# Patient Record
Sex: MALE | Race: BLACK OR AFRICAN AMERICAN | NOT HISPANIC OR LATINO
[De-identification: names, ages, dates, MRNs, and addresses within clinical notes are randomized per-mention and may not be internally consistent; named-entity substitution may affect disease eponyms.]

---

## 2020-01-14 ENCOUNTER — TRANSCRIPTION ENCOUNTER (OUTPATIENT)
Age: 50
End: 2020-01-14

## 2021-01-17 ENCOUNTER — TRANSCRIPTION ENCOUNTER (OUTPATIENT)
Age: 51
End: 2021-01-17

## 2021-01-28 ENCOUNTER — APPOINTMENT (OUTPATIENT)
Dept: GASTROENTEROLOGY | Facility: CLINIC | Age: 51
End: 2021-01-28
Payer: COMMERCIAL

## 2021-01-28 VITALS
SYSTOLIC BLOOD PRESSURE: 130 MMHG | WEIGHT: 234 LBS | HEIGHT: 71 IN | DIASTOLIC BLOOD PRESSURE: 100 MMHG | TEMPERATURE: 98 F | BODY MASS INDEX: 32.76 KG/M2

## 2021-01-28 DIAGNOSIS — Z01.818 ENCOUNTER FOR OTHER PREPROCEDURAL EXAMINATION: ICD-10-CM

## 2021-01-28 DIAGNOSIS — L30.9 DERMATITIS, UNSPECIFIED: ICD-10-CM

## 2021-01-28 DIAGNOSIS — Z12.11 ENCOUNTER FOR SCREENING FOR MALIGNANT NEOPLASM OF COLON: ICD-10-CM

## 2021-01-28 DIAGNOSIS — Z83.79 FAMILY HISTORY OF OTHER DISEASES OF THE DIGESTIVE SYSTEM: ICD-10-CM

## 2021-01-28 DIAGNOSIS — Z78.9 OTHER SPECIFIED HEALTH STATUS: ICD-10-CM

## 2021-01-28 PROCEDURE — 99072 ADDL SUPL MATRL&STAF TM PHE: CPT

## 2021-01-28 PROCEDURE — 99242 OFF/OP CONSLTJ NEW/EST SF 20: CPT

## 2021-01-28 RX ORDER — DUPILUMAB 200 MG/1.14ML
200 INJECTION, SOLUTION SUBCUTANEOUS
Refills: 0 | Status: ACTIVE | COMMUNITY

## 2021-01-28 RX ORDER — SODIUM SULFATE, POTASSIUM SULFATE, MAGNESIUM SULFATE 17.5; 3.13; 1.6 G/ML; G/ML; G/ML
17.5-3.13-1.6 SOLUTION, CONCENTRATE ORAL
Qty: 1 | Refills: 0 | Status: ACTIVE | COMMUNITY
Start: 2021-01-28 | End: 1900-01-01

## 2021-01-28 NOTE — HISTORY OF PRESENT ILLNESS
[de-identified] : Jan 28, 2021 \par \par NIGEL ARREDONDO \par \par Mr. GEMA MONTANEZ 50 year is referred for colon cancer screening.  The patient denies any change in bowel movements, blood per rectum, abdominal, pelvic or rectal discomfort.   \par \par There is no family history of colon cancer or other gastrointestinal cancers.\par \par The patient denies any unexplained weight loss, fever chills or night sweats.\par \par This is the first screening colonoscopy for the patient.\par \par There is no significant cardiac or pulmonary history.\par \par No complaints of chest pain, shortness of breath, palpitations, cough.\par \par The patient is feeling quite well.\par \par The patient is on no significant anticoagulant therapy or anti platelet therapy. \par \par No adverse reaction to anesthesia in the past.\par \par

## 2021-01-28 NOTE — CONSULT LETTER
[Dear  ___] : Dear ~DAVIDE, [Consult Letter:] : I had the pleasure of evaluating your patient, [unfilled]. [Please see my note below.] : Please see my note below. [Consult Closing:] : Thank you very much for allowing me to participate in the care of this patient.  If you have any questions, please do not hesitate to contact me. [Sincerely,] : Sincerely, [FreeTextEntry2] : Yarelis Downs NP\par [address] [facility]\[place] [address]\par  [FreeTextEntry3] : Jim Sanz MD\par

## 2021-01-28 NOTE — PHYSICAL EXAM
[General Appearance - Alert] : alert [General Appearance - In No Acute Distress] : in no acute distress [General Appearance - Well Nourished] : well nourished [General Appearance - Well Developed] : well developed [General Appearance - Well-Appearing] : healthy appearing [FreeTextEntry1] : obese [Sclera] : the sclera and conjunctiva were normal [Neck Appearance] : the appearance of the neck was normal [Neck Cervical Mass (___cm)] : no neck mass was observed [Jugular Venous Distention Increased] : there was no jugular-venous distention [Auscultation Breath Sounds / Voice Sounds] : lungs were clear to auscultation bilaterally [Apical Impulse] : the apical impulse was normal [Full Pulse] : the pedal pulses are present [Edema] : there was no peripheral edema [Bowel Sounds] : normal bowel sounds [Abdomen Soft] : soft [Abdomen Tenderness] : non-tender [Abdomen Mass (___ Cm)] : no abdominal mass palpated [Cervical Lymph Nodes Enlarged Posterior Bilaterally] : posterior cervical [Cervical Lymph Nodes Enlarged Anterior Bilaterally] : anterior cervical [Supraclavicular Lymph Nodes Enlarged Bilaterally] : supraclavicular [Axillary Lymph Nodes Enlarged Bilaterally] : axillary [Femoral Lymph Nodes Enlarged Bilaterally] : femoral [Inguinal Lymph Nodes Enlarged Bilaterally] : inguinal [No CVA Tenderness] : no ~M costovertebral angle tenderness [No Spinal Tenderness] : no spinal tenderness [Abnormal Walk] : normal gait [Nail Clubbing] : no clubbing  or cyanosis of the fingernails [Musculoskeletal - Swelling] : no joint swelling seen [Motor Tone] : muscle strength and tone were normal [Skin Turgor] : normal skin turgor [Skin Color & Pigmentation] : normal skin color and pigmentation [] : no rash [No Focal Deficits] : no focal deficits [Oriented To Time, Place, And Person] : oriented to person, place, and time [Impaired Insight] : insight and judgment were intact [Affect] : the affect was normal

## 2021-03-24 ENCOUNTER — APPOINTMENT (OUTPATIENT)
Dept: GASTROENTEROLOGY | Facility: AMBULATORY MEDICAL SERVICES | Age: 51
End: 2021-03-24
Payer: COMMERCIAL

## 2021-03-24 PROCEDURE — 45378 DIAGNOSTIC COLONOSCOPY: CPT

## 2021-04-08 ENCOUNTER — NON-APPOINTMENT (OUTPATIENT)
Age: 51
End: 2021-04-08

## 2021-10-03 ENCOUNTER — HOSPITAL ENCOUNTER (EMERGENCY)
Facility: HOSPITAL | Age: 51
Discharge: HOME | End: 2021-10-03
Attending: EMERGENCY MEDICINE
Payer: COMMERCIAL

## 2021-10-03 ENCOUNTER — APPOINTMENT (EMERGENCY)
Dept: RADIOLOGY | Facility: HOSPITAL | Age: 51
End: 2021-10-03
Payer: COMMERCIAL

## 2021-10-03 VITALS
HEART RATE: 94 BPM | OXYGEN SATURATION: 97 % | SYSTOLIC BLOOD PRESSURE: 155 MMHG | RESPIRATION RATE: 20 BRPM | TEMPERATURE: 97.5 F | DIASTOLIC BLOOD PRESSURE: 108 MMHG

## 2021-10-03 DIAGNOSIS — S86.812A PATELLAR TENDON RUPTURE, LEFT, INITIAL ENCOUNTER: Primary | ICD-10-CM

## 2021-10-03 PROCEDURE — 99283 EMERGENCY DEPT VISIT LOW MDM: CPT

## 2021-10-03 PROCEDURE — 99284 EMERGENCY DEPT VISIT MOD MDM: CPT | Performed by: EMERGENCY MEDICINE

## 2021-10-03 PROCEDURE — 73564 X-RAY EXAM KNEE 4 OR MORE: CPT

## 2021-10-03 RX ORDER — METHOCARBAMOL 500 MG/1
500 TABLET, FILM COATED ORAL EVERY 8 HOURS PRN
Qty: 20 TABLET | Refills: 0 | Status: SHIPPED | OUTPATIENT
Start: 2021-10-03

## 2021-10-03 RX ORDER — OXYCODONE HYDROCHLORIDE AND ACETAMINOPHEN 5; 325 MG/1; MG/1
1 TABLET ORAL ONCE
Status: DISCONTINUED | OUTPATIENT
Start: 2021-10-03 | End: 2021-10-03

## 2021-10-03 RX ORDER — KETOROLAC TROMETHAMINE 30 MG/ML
30 INJECTION, SOLUTION INTRAMUSCULAR; INTRAVENOUS ONCE
Status: DISCONTINUED | OUTPATIENT
Start: 2021-10-03 | End: 2021-10-03 | Stop reason: HOSPADM

## 2021-10-06 ENCOUNTER — NON-APPOINTMENT (OUTPATIENT)
Age: 51
End: 2021-10-06

## 2021-10-07 ENCOUNTER — APPOINTMENT (OUTPATIENT)
Dept: ORTHOPEDIC SURGERY | Facility: CLINIC | Age: 51
End: 2021-10-07
Payer: COMMERCIAL

## 2021-10-07 ENCOUNTER — NON-APPOINTMENT (OUTPATIENT)
Age: 51
End: 2021-10-07

## 2021-10-07 DIAGNOSIS — S86.812A STRAIN OF OTHER MUSCLE(S) AND TENDON(S) AT LOWER LEG LEVEL, LEFT LEG, INITIAL ENCOUNTER: ICD-10-CM

## 2021-10-07 PROCEDURE — 99203 OFFICE O/P NEW LOW 30 MIN: CPT

## 2021-10-07 NOTE — HISTORY OF PRESENT ILLNESS
[de-identified] : Pt is a 49 y/o male with left knee injury.  He fell down three steps outside on 10/3/21.  He was in [place] and was seen at [facility]'s ED.  Xrays revealed a dislocated patella.  He states that a reduction was not attempted because they believed that he ruptured his tendon.  A knee immobilizer was applied and he was advised to follow up with a specialist.  He states that he had surgical repair of a ruptured right patella tendon years ago.

## 2021-10-07 NOTE — DISCUSSION/SUMMARY
[de-identified] : The underlying pathophysiology was reviewed with the patient. XR films were reviewed with the patient. Discussed at length the nature of the patient’s condition. The left knee symptoms appear secondary to patella tendon rupture.\par \par Treatment options were discussed including; surgical intervention. The patient wishes to proceed with left patella tendon repair at this time. The risks and benefits were reviewed with the patient. All of his questions were answered. He will meet with our surgical scheduler. \par

## 2021-10-07 NOTE — END OF VISIT
[FreeTextEntry3] : All medical record entries made by the Scribe were at my,  Dr. Washington Marques MD., direction and personally dictated by me on 10/07/2021. I have personally reviewed the chart and agree that the record accurately reflects my personal performance of the history, physical exam, assessment and plan.

## 2021-10-07 NOTE — ADDENDUM
[FreeTextEntry1] : I, Dalila Pak wrote this note acting as a scribe for Dr. Washington Marques on Oct 07, 2021.

## 2021-10-07 NOTE — PHYSICAL EXAM
[de-identified] : Patient is WDWN, alert, and in no acute distress. Breathing is unlabored. He is grossly oriented to person, place, and time.\par \par Patient presents in a knee immobilizer brace and is ambulating with crutches.\par \par Left Knee:\par High riding patella.\par Edema noted\par Ecchymosis noted.\par No active extension of the knee. \par  [de-identified] : 4 views of the left knee revealed the patella to be high riding due to tear of the patellar tendon.

## 2021-10-11 ENCOUNTER — OUTPATIENT (OUTPATIENT)
Dept: OUTPATIENT SERVICES | Facility: HOSPITAL | Age: 51
LOS: 1 days | End: 2021-10-11
Payer: COMMERCIAL

## 2021-10-11 VITALS
HEIGHT: 71 IN | WEIGHT: 220.02 LBS | HEART RATE: 68 BPM | DIASTOLIC BLOOD PRESSURE: 87 MMHG | TEMPERATURE: 96 F | OXYGEN SATURATION: 98 % | SYSTOLIC BLOOD PRESSURE: 123 MMHG | RESPIRATION RATE: 16 BRPM

## 2021-10-11 DIAGNOSIS — S86.819A STRAIN OF OTHER MUSCLE(S) AND TENDON(S) AT LOWER LEG LEVEL, UNSPECIFIED LEG, INITIAL ENCOUNTER: ICD-10-CM

## 2021-10-11 DIAGNOSIS — S86.812A STRAIN OF OTHER MUSCLE(S) AND TENDON(S) AT LOWER LEG LEVEL, LEFT LEG, INITIAL ENCOUNTER: ICD-10-CM

## 2021-10-11 DIAGNOSIS — Z98.890 OTHER SPECIFIED POSTPROCEDURAL STATES: Chronic | ICD-10-CM

## 2021-10-11 DIAGNOSIS — Z98.89 OTHER SPECIFIED POSTPROCEDURAL STATES: Chronic | ICD-10-CM

## 2021-10-11 DIAGNOSIS — Z01.818 ENCOUNTER FOR OTHER PREPROCEDURAL EXAMINATION: ICD-10-CM

## 2021-10-11 LAB
ANION GAP SERPL CALC-SCNC: 7 MMOL/L — SIGNIFICANT CHANGE UP (ref 5–17)
BUN SERPL-MCNC: 13 MG/DL — SIGNIFICANT CHANGE UP (ref 7–23)
CALCIUM SERPL-MCNC: 9 MG/DL — SIGNIFICANT CHANGE UP (ref 8.4–10.5)
CHLORIDE SERPL-SCNC: 105 MMOL/L — SIGNIFICANT CHANGE UP (ref 96–108)
CO2 SERPL-SCNC: 26 MMOL/L — SIGNIFICANT CHANGE UP (ref 22–31)
CREAT SERPL-MCNC: 1.13 MG/DL — SIGNIFICANT CHANGE UP (ref 0.5–1.3)
GLUCOSE SERPL-MCNC: 145 MG/DL — HIGH (ref 70–99)
HCT VFR BLD CALC: 43 % — SIGNIFICANT CHANGE UP (ref 39–50)
HGB BLD-MCNC: 14.8 G/DL — SIGNIFICANT CHANGE UP (ref 13–17)
MCHC RBC-ENTMCNC: 29.7 PG — SIGNIFICANT CHANGE UP (ref 27–34)
MCHC RBC-ENTMCNC: 34.4 GM/DL — SIGNIFICANT CHANGE UP (ref 32–36)
MCV RBC AUTO: 86.3 FL — SIGNIFICANT CHANGE UP (ref 80–100)
NRBC # BLD: 0 /100 WBCS — SIGNIFICANT CHANGE UP (ref 0–0)
PLATELET # BLD AUTO: 215 K/UL — SIGNIFICANT CHANGE UP (ref 150–400)
POTASSIUM SERPL-MCNC: 3.9 MMOL/L — SIGNIFICANT CHANGE UP (ref 3.5–5.3)
POTASSIUM SERPL-SCNC: 3.9 MMOL/L — SIGNIFICANT CHANGE UP (ref 3.5–5.3)
RBC # BLD: 4.98 M/UL — SIGNIFICANT CHANGE UP (ref 4.2–5.8)
RBC # FLD: 12.1 % — SIGNIFICANT CHANGE UP (ref 10.3–14.5)
SODIUM SERPL-SCNC: 138 MMOL/L — SIGNIFICANT CHANGE UP (ref 135–145)
WBC # BLD: 5.63 K/UL — SIGNIFICANT CHANGE UP (ref 3.8–10.5)
WBC # FLD AUTO: 5.63 K/UL — SIGNIFICANT CHANGE UP (ref 3.8–10.5)

## 2021-10-11 PROCEDURE — 36415 COLL VENOUS BLD VENIPUNCTURE: CPT

## 2021-10-11 PROCEDURE — 93010 ELECTROCARDIOGRAM REPORT: CPT

## 2021-10-11 PROCEDURE — 93005 ELECTROCARDIOGRAM TRACING: CPT

## 2021-10-11 PROCEDURE — U0005: CPT

## 2021-10-11 PROCEDURE — U0003: CPT

## 2021-10-11 PROCEDURE — 80048 BASIC METABOLIC PNL TOTAL CA: CPT

## 2021-10-11 PROCEDURE — G0463: CPT

## 2021-10-11 PROCEDURE — 85027 COMPLETE CBC AUTOMATED: CPT

## 2021-10-11 NOTE — H&P PST ADULT - NS NEC GEN PE MLT EXAM PC
Telephone Encounter by Yanelis Bobby CMA at 08/03/18 10:57 AM     Author:  Yanelis Bobby CMA Service:  (none) Author Type:  Certified Medical Assistant     Filed:  08/03/18 10:57 AM Encounter Date:  8/1/2018 Status:  Signed     :  Yanelis Bobby CMA (Certified Medical Assistant)       From: Uri Dietrich  To: Yuniel Saini MD  Sent: 8/1/2018  2:07 PM CDT  Subject: Medication Renewal Request    Original authorizing provider: MD Uri Wiley would like a refill of the following medications:  Bumetanide (BUMEX) 1 MG tablet [Yuniel Saini MD]    Preferred pharmacy: [facility] [address]    Comment:         Revision History        Date/Time User Provider Type Action    > 08/03/18 10:57 AM Yanelis Bobby CMA Certified Medical Assistant Sign    Attribution information within the note text is not available.            
Telephone Encounter by Yanelis Bobby CMA at 08/03/18 12:33 PM     Author:  Yanelis Bobby CMA Service:  (none) Author Type:  Certified Medical Assistant     Filed:  08/03/18 12:36 PM Encounter Date:  8/1/2018 Status:  Signed     :  Yanelis Bobby CMA (Certified Medical Assistant)            Fwd to[BR1.1T] Dr. Gonzalez[BR1.1M]. Below refill request requires your intervention because:   · No protocol for medication available  · Last refilled on[BR1.1T] 11/2/17[BR1.1M] for #[BR1.1T]360[BR1.1M] with[BR1.1T] 0[BR1.1M] additional refills.[BR1.1T]         Diuretics Refill Protocol Failed8/3 10:58 AM   Last BP under 140/90 in past year or if patient has diabetes, CAD, or PVD, BP under 130/80 in past year[BR1.1C]           Revision History        User Key Date/Time User Provider Type Action    > BR1.1 08/03/18 12:36 PM Yanelis Bobby CMA Certified Medical Assistant Sign    C - Copied, M - Manual, T - Template            
Telephone Encounter by Yanelis Bobby CMA at 08/03/18 12:38 PM     Author:  Yanelis Bobby CMA Service:  (none) Author Type:  Certified Medical Assistant     Filed:  08/03/18 12:39 PM Encounter Date:  8/1/2018 Status:  Signed     :  Yanelis Bobby CMA (Certified Medical Assistant)            Sent in error to Dr. Gonzalez    Fwd to Dr. Saini: please advise on refill. Thank janice carl[BR1.1M]      Revision History        User Key Date/Time User Provider Type Action    > BR1.1 08/03/18 12:39 PM Yanelis Bobby CMA Certified Medical Assistant Sign    M - Manual            
Telephone Encounter by Yuniel Saini MD at 08/07/18 05:24 PM     Author:  Yuniel Saini MD Service:  (none) Author Type:  Physician     Filed:  08/07/18 05:24 PM Encounter Date:  8/1/2018 Status:  Signed     :  Yuniel Saini MD (Physician)            ok x   6 mo[SH1.1M]      Revision History        User Key Date/Time User Provider Type Action    > SH1.1 08/07/18 05:24 PM Yuniel Saini MD Physician Sign    M - Manual            
No bruits; no thyromegaly or nodules

## 2021-10-11 NOTE — H&P PST ADULT - HISTORY OF PRESENT ILLNESS
51 yo male reports  51 yo male reports fall injury 10/3/2021 whicg resulted in left pateela tendon rupture.  He is scheduled for repair of left patella tendon on 10/15/2021 at [facility].

## 2021-10-11 NOTE — H&P PST ADULT - NSICDXPASTMEDICALHX_GEN_ALL_CORE_FT
PAST MEDICAL HISTORY:  Eczema      PAST MEDICAL HISTORY:  Eczema     Patellar tendon rupture left

## 2021-10-11 NOTE — H&P PST ADULT - PROBLEM SELECTOR PLAN 1
repair of left patella tendon is planned for 10/15/2021  Covid PCR testing today  medical clearance requested  Pre op instructions reviewed; best wishes offered.

## 2021-10-11 NOTE — H&P PST ADULT - NSICDXFAMILYHX_GEN_ALL_CORE_FT
FAMILY HISTORY:  Father  Still living? No  Family history of heart failure, Age at diagnosis: Age Unknown    Sibling  Still living? No  Family history of cancer, Age at diagnosis: Age Unknown  Family history of cardiac arrest, Age at diagnosis: Age Unknown

## 2021-10-11 NOTE — H&P PST ADULT - NSICDXPASTSURGICALHX_GEN_ALL_CORE_FT
PAST SURGICAL HISTORY:  S/P appendectomy 7/1985    Status post tendon repair patella tendon right, 2015

## 2021-10-11 NOTE — H&P PST ADULT - MUSCULOSKELETAL
no calf tenderness/decreased ROM due to pain details… detailed exam no calf tenderness/decreased ROM due to pain/joint swelling

## 2021-10-12 LAB — SARS-COV-2 RNA SPEC QL NAA+PROBE: SIGNIFICANT CHANGE UP

## 2021-10-14 ENCOUNTER — TRANSCRIPTION ENCOUNTER (OUTPATIENT)
Age: 51
End: 2021-10-14

## 2021-10-15 ENCOUNTER — APPOINTMENT (OUTPATIENT)
Dept: ORTHOPEDIC SURGERY | Facility: HOSPITAL | Age: 51
End: 2021-10-15

## 2021-10-15 ENCOUNTER — OUTPATIENT (OUTPATIENT)
Dept: OUTPATIENT SERVICES | Facility: HOSPITAL | Age: 51
LOS: 1 days | End: 2021-10-15
Payer: COMMERCIAL

## 2021-10-15 VITALS
WEIGHT: 219.8 LBS | TEMPERATURE: 99 F | DIASTOLIC BLOOD PRESSURE: 95 MMHG | OXYGEN SATURATION: 99 % | HEART RATE: 95 BPM | HEIGHT: 71 IN | RESPIRATION RATE: 16 BRPM | SYSTOLIC BLOOD PRESSURE: 134 MMHG

## 2021-10-15 VITALS
DIASTOLIC BLOOD PRESSURE: 91 MMHG | RESPIRATION RATE: 52 BRPM | HEART RATE: 90 BPM | TEMPERATURE: 97 F | OXYGEN SATURATION: 95 % | SYSTOLIC BLOOD PRESSURE: 137 MMHG

## 2021-10-15 DIAGNOSIS — Z98.890 OTHER SPECIFIED POSTPROCEDURAL STATES: Chronic | ICD-10-CM

## 2021-10-15 DIAGNOSIS — Z98.89 OTHER SPECIFIED POSTPROCEDURAL STATES: Chronic | ICD-10-CM

## 2021-10-15 DIAGNOSIS — Z01.818 ENCOUNTER FOR OTHER PREPROCEDURAL EXAMINATION: ICD-10-CM

## 2021-10-15 DIAGNOSIS — S86.812A STRAIN OF OTHER MUSCLE(S) AND TENDON(S) AT LOWER LEG LEVEL, LEFT LEG, INITIAL ENCOUNTER: ICD-10-CM

## 2021-10-15 PROCEDURE — 97161 PT EVAL LOW COMPLEX 20 MIN: CPT

## 2021-10-15 PROCEDURE — 27380 REPAIR OF KNEECAP TENDON: CPT | Mod: LT

## 2021-10-15 RX ORDER — SODIUM CHLORIDE 9 MG/ML
1000 INJECTION, SOLUTION INTRAVENOUS
Refills: 0 | Status: DISCONTINUED | OUTPATIENT
Start: 2021-10-15 | End: 2021-10-21

## 2021-10-15 RX ORDER — DUPILUMAB 300 MG/2ML
0 INJECTION, SOLUTION SUBCUTANEOUS
Qty: 0 | Refills: 0 | DISCHARGE

## 2021-10-15 RX ORDER — ASPIRIN/CALCIUM CARB/MAGNESIUM 324 MG
1 TABLET ORAL
Qty: 84 | Refills: 0
Start: 2021-10-15 | End: 2021-11-25

## 2021-10-15 RX ORDER — ONDANSETRON 8 MG/1
4 TABLET, FILM COATED ORAL ONCE
Refills: 0 | Status: DISCONTINUED | OUTPATIENT
Start: 2021-10-15 | End: 2021-10-21

## 2021-10-15 RX ORDER — OXYCODONE HYDROCHLORIDE 5 MG/1
5 TABLET ORAL ONCE
Refills: 0 | Status: DISCONTINUED | OUTPATIENT
Start: 2021-10-15 | End: 2021-10-21

## 2021-10-15 RX ORDER — HYDROMORPHONE HYDROCHLORIDE 2 MG/ML
0.5 INJECTION INTRAMUSCULAR; INTRAVENOUS; SUBCUTANEOUS
Refills: 0 | Status: DISCONTINUED | OUTPATIENT
Start: 2021-10-15 | End: 2021-10-21

## 2021-10-15 RX ORDER — IBUPROFEN 200 MG
1 TABLET ORAL
Qty: 30 | Refills: 0
Start: 2021-10-15

## 2021-10-15 RX ORDER — CEFAZOLIN SODIUM 1 G
2000 VIAL (EA) INJECTION ONCE
Refills: 0 | Status: COMPLETED | OUTPATIENT
Start: 2021-10-15 | End: 2021-10-15

## 2021-10-15 RX ORDER — CHLORHEXIDINE GLUCONATE 213 G/1000ML
1 SOLUTION TOPICAL ONCE
Refills: 0 | Status: COMPLETED | OUTPATIENT
Start: 2021-10-15 | End: 2021-10-15

## 2021-10-15 RX ORDER — APREPITANT 80 MG/1
40 CAPSULE ORAL ONCE
Refills: 0 | Status: COMPLETED | OUTPATIENT
Start: 2021-10-15 | End: 2021-10-15

## 2021-10-15 RX ADMIN — HYDROMORPHONE HYDROCHLORIDE 0.5 MILLIGRAM(S): 2 INJECTION INTRAMUSCULAR; INTRAVENOUS; SUBCUTANEOUS at 15:25

## 2021-10-15 RX ADMIN — CHLORHEXIDINE GLUCONATE 1 APPLICATION(S): 213 SOLUTION TOPICAL at 12:35

## 2021-10-15 RX ADMIN — SODIUM CHLORIDE 75 MILLILITER(S): 9 INJECTION, SOLUTION INTRAVENOUS at 15:24

## 2021-10-15 RX ADMIN — HYDROMORPHONE HYDROCHLORIDE 0.5 MILLIGRAM(S): 2 INJECTION INTRAMUSCULAR; INTRAVENOUS; SUBCUTANEOUS at 15:50

## 2021-10-15 RX ADMIN — HYDROMORPHONE HYDROCHLORIDE 0.5 MILLIGRAM(S): 2 INJECTION INTRAMUSCULAR; INTRAVENOUS; SUBCUTANEOUS at 15:35

## 2021-10-15 RX ADMIN — HYDROMORPHONE HYDROCHLORIDE 0.5 MILLIGRAM(S): 2 INJECTION INTRAMUSCULAR; INTRAVENOUS; SUBCUTANEOUS at 15:40

## 2021-10-15 RX ADMIN — APREPITANT 40 MILLIGRAM(S): 80 CAPSULE ORAL at 12:34

## 2021-10-15 NOTE — BRIEF OPERATIVE NOTE - NSICDXBRIEFPOSTOP_GEN_ALL_CORE_FT
POST-OP DIAGNOSIS:  Patellar tendon rupture, left, initial encounter 15-Oct-2021 15:07:54  Mark Hubbard

## 2021-10-15 NOTE — ASU DISCHARGE PLAN (ADULT/PEDIATRIC) - ASU DC SPECIAL INSTRUCTIONSFT
Keep cast clean and dry  elevate left leg to reduce swelling/stiffness  call office for appointment  to be seen  10-14 days post op

## 2021-10-15 NOTE — PHYSICAL THERAPY INITIAL EVALUATION ADULT - ADDITIONAL COMMENTS
Pt is going to stay in mom's house w/ 3 steps to enter with no rail. Pt is staying on the first floor

## 2021-10-15 NOTE — BRIEF OPERATIVE NOTE - NSICDXBRIEFPREOP_GEN_ALL_CORE_FT
PRE-OP DIAGNOSIS:  Patellar tendon rupture, left, initial encounter 15-Oct-2021 15:08:03  Mark Hubbard

## 2021-10-18 PROBLEM — S86.819A STRAIN OF OTHER MUSCLE(S) AND TENDON(S) AT LOWER LEG LEVEL, UNSPECIFIED LEG, INITIAL ENCOUNTER: Chronic | Status: ACTIVE | Noted: 2021-10-11

## 2021-10-21 ENCOUNTER — APPOINTMENT (OUTPATIENT)
Dept: ORTHOPEDIC SURGERY | Facility: CLINIC | Age: 51
End: 2021-10-21
Payer: COMMERCIAL

## 2021-10-21 PROCEDURE — 99024 POSTOP FOLLOW-UP VISIT: CPT

## 2021-10-21 NOTE — END OF VISIT
[FreeTextEntry3] : All medical record entries made by the Scribe were at my,  Dr. Washington Marques MD., direction and personally dictated by me on 10/21/2021. I have personally reviewed the chart and agree that the record accurately reflects my personal performance of the history, physical exam, assessment and plan.

## 2021-10-21 NOTE — HISTORY OF PRESENT ILLNESS
[de-identified] : s/p Repair of left patellar tendon.Application of long leg cylinder cast. [de-identified] : The patient is a 50 year male who returns for the 1st postoperative visit after undergoing Repair of left patellar tendon and application of long leg cylinder cast at [facility]. The surgery was on 10/15/2021. The patient is recovering at home. He reports mild postoperative pain. He presents today on 10/21/21 for windowing of the cast and suture removal. [de-identified] : Patient is WDWN, alert, and in no acute distress. Breathing is unlabored. He is grossly oriented to person, place, and time.\par \par Cast was windowed to visualize the incision site. Sutures were removed. Incision site is healing well, without signs of postoperative infection. Normal amount of postoperative edema and tenderness are present.  [de-identified] : no imaging today  [de-identified] : Cast windowed. Sutures removed. Benzoin and steri strips applied.\par He was advised that he will be in the long leg cylinder cast for a total of 6 weeks from the surgery. \par He was instructed on leg lift exercises of the lower extremities. \par Paperwork filled out for the patients job from the [employer].\par Rx: Adjustable cane.\par Follow up in 4 weeks.

## 2021-10-21 NOTE — ADDENDUM
[FreeTextEntry1] : I, Dalila Pak wrote this note acting as a scribe for Dr. Washington Marques on Oct 21, 2021.

## 2021-11-22 ENCOUNTER — APPOINTMENT (OUTPATIENT)
Dept: ORTHOPEDIC SURGERY | Facility: CLINIC | Age: 51
End: 2021-11-22
Payer: COMMERCIAL

## 2021-11-22 VITALS — HEIGHT: 71 IN | WEIGHT: 220 LBS | BODY MASS INDEX: 30.8 KG/M2

## 2021-11-22 PROCEDURE — 99024 POSTOP FOLLOW-UP VISIT: CPT

## 2021-11-22 NOTE — HISTORY OF PRESENT ILLNESS
[Chills] : no chills [Fever] : no fever [de-identified] : s/p Repair of left patellar tendon.Application of long leg cylinder cast. [de-identified] : The patient is a 50 year male who returns for the 2nd postoperative visit after undergoing Repair of left patellar tendon and application of long leg cylinder cast at [facility]. The surgery was on 10/15/2021. He presents 11/21/2021 for follow up evaluation. He is inquiring about PT. He states he has been using natalie butter.  [de-identified] : Patient is WDWN, alert, and in no acute distress. Breathing is unlabored. He is grossly oriented to person, place, and time.\par \par Patient is WDWN, alert, and in no acute distress. Breathing is unlabored. He is grossly oriented to person, place, and time.\par Cast removed today. \par Left knee: Incision is healing well. There are no signs of infection. Normal amount of postoperative edema and tenderness present.  Knee immobilizer placed.  [de-identified] : AP, lateral, skyline, and tunnel views of the left knee were obtained and revealed S/p #2 fiberwire suture through the medial aspect of the patellar tendon.  [de-identified] : The underlying pathophysiology was reviewed with the patient. XR films were reviewed with the patient. Discussed at length the nature of the patient’s condition.\par \par Patient may follow up in 4 weeks for repeat Xrays in the cast. Cast was removed. A knee immobilizer was placed. We discussed appropriate showering with the knee immobilizer. Weight bearing as tolerated. \par PT script was given for left knee ROM. \par \par Patient can continue activities as tolerated. All questions answered, understanding verbalized. Patient in agreement with plan of care.

## 2021-11-22 NOTE — ADDENDUM
[FreeTextEntry1] : Documented by Rufino Ann acting as a scribe for Dr. Washington Marques on 11/22/2021. All medical record entries made by the Scribe were at my, Dr. Washington Marques, direction and personally dictated by me on 11/22/2021. I have reviewed the chart and agree that the record accurately reflects my personal performance of the history, physical exam, assessment and plan. I have also personally directed, reviewed, and agreed with the chart.

## 2021-12-23 ENCOUNTER — APPOINTMENT (OUTPATIENT)
Dept: ORTHOPEDIC SURGERY | Facility: CLINIC | Age: 51
End: 2021-12-23
Payer: COMMERCIAL

## 2021-12-23 PROCEDURE — 99024 POSTOP FOLLOW-UP VISIT: CPT

## 2021-12-23 NOTE — HISTORY OF PRESENT ILLNESS
[Chills] : no chills [Fever] : no fever [de-identified] : s/p Repair of left patellar tendon and Application of long leg cylinder cast. [de-identified] : The patient is a 50 year male who returns for the 3rd postoperative visit after undergoing Repair of left patellar tendon and application of long leg cylinder cast at [facility]. The surgery was on 10/15/2021. The cast was removed in office on 11/22/21. He presents on 12/23/21 in a knee immobilizer brace. He is in PT at [facility]. He is inquiring about a more structured PT protocol at the request of his therapist. No pain medication at this time.  [de-identified] : Patient is WDWN, alert, and in no acute distress. Breathing is unlabored. He is grossly oriented to person, place, and time.\par \par Patient is WDWN, alert, and in no acute distress. Breathing is unlabored. He is grossly oriented to person, place, and time.\par \par Cast removed on 11/22/21.\par He presents to the office on 12/23/21 in a knee immobilizer brace. \par \par Left knee: Incision is well healed over the patella. There are no signs of infection. Normal amount of postoperative edema and tenderness present.  \par ROM: 0-90°  [de-identified] : no imaging today. [de-identified] : I am advising him to discontinue use of the brace at this time except then going outdoors during rain or snow out of precaution. \par He was instructed to continue with the use of an assistive device as long as his gait is unsteady. \par He may begin use of a stationary bike.\par He was advised that he has no restrictions with regard to physical therapy and should focus on ROM prior to strengthening.\par The patient wishes to proceed with physical therapy of the left knee (WBAT, ROM - 3x/week). A script was given.\par Paperwork was filled out for the patient's employer. He will remain out of work at this time and does not have an expected date of return as of today, 12/23/21.\par Follow up in 4 weeks.

## 2021-12-23 NOTE — END OF VISIT
[FreeTextEntry3] : All medical record entries made by the Scribe were at my,  Dr. Washington Marques MD., direction and personally dictated by me on 12/23/2021. I have personally reviewed the chart and agree that the record accurately reflects my personal performance of the history, physical exam, assessment and plan.

## 2021-12-23 NOTE — ADDENDUM
[FreeTextEntry1] : I, Dalila Pak wrote this note acting as a scribe for Dr. Washington Marques on Dec 23, 2021.

## 2022-01-24 ENCOUNTER — APPOINTMENT (OUTPATIENT)
Dept: ORTHOPEDIC SURGERY | Facility: CLINIC | Age: 52
End: 2022-01-24
Payer: COMMERCIAL

## 2022-01-24 VITALS — HEIGHT: 71 IN | BODY MASS INDEX: 29.4 KG/M2 | WEIGHT: 210 LBS

## 2022-01-24 PROCEDURE — 99213 OFFICE O/P EST LOW 20 MIN: CPT

## 2022-01-24 RX ORDER — LOTEPREDNOL ETABONATE 5 MG/G
0.5 GEL OPHTHALMIC
Qty: 5 | Refills: 0 | Status: ACTIVE | COMMUNITY
Start: 2021-12-27

## 2022-01-24 RX ORDER — DUPILUMAB 300 MG/2ML
300 INJECTION, SOLUTION SUBCUTANEOUS
Qty: 4 | Refills: 0 | Status: ACTIVE | COMMUNITY
Start: 2022-01-19

## 2022-01-24 RX ORDER — LOTEPREDNOL ETABONATE 5 MG/G
0.5 OINTMENT OPHTHALMIC
Qty: 4 | Refills: 0 | Status: ACTIVE | COMMUNITY
Start: 2021-12-28

## 2022-01-24 NOTE — HISTORY OF PRESENT ILLNESS
[de-identified] : The patient is a 50 year male who returns for a follow up visit after undergoing Repair of left patellar tendon and application of long leg cylinder cast at [facility]. The surgery was on 10/15/2021. The cast was removed in office on 11/22/21. He returns on 1/24/22 and reports to be improving. He notes improvements in motion. He is currently in PT and follows an at home exercise program as well. He reports to go back to work at the end of March 2022.

## 2022-01-24 NOTE — DISCUSSION/SUMMARY
[de-identified] : The underlying pathophysiology was reviewed with the patient. Discussed at length the nature of the patient’s condition. \par \par Patient will continue with PT as well as an at home exercise program.\par He was encouraged to utilize a stationary bike as well as continue with exercises such as leg lifts for ROM and strengthening, respectively.\par The patient wishes to proceed with physical therapy of the left knee. (ROM and strengthening).  A script was given.\par \par Paperwork filled out for the patient's employer. He anticipates returning to work at the end of March 2022. \par \par All questions answered, understanding verbalized. Patient in agreement with plan of care. Follow up in 1 month.

## 2022-01-24 NOTE — END OF VISIT
[FreeTextEntry3] : All medical record entries made by the Scribe were at my,  Dr. Washington Marques MD., direction and personally dictated by me on 01/24/2022. I have personally reviewed the chart and agree that the record accurately reflects my personal performance of the history, physical exam, assessment and plan.

## 2022-01-24 NOTE — ADDENDUM
[FreeTextEntry1] : I, Dalila Pak wrote this note acting as a scribe for Dr. Washington Marques on Jan 24, 2022.

## 2022-01-24 NOTE — PHYSICAL EXAM
[de-identified] : Patient is WDWN, alert, and in no acute distress. Breathing is unlabored. He is grossly oriented to person, place, and time.\par \par Cast removed on 11/22/21.\par \par Left knee: \par Incision is well healed over the patella. \par There are no signs of infection. \par No deformities or visible rashes to the lower extremity. \par ROM: 0-100°.  [de-identified] : no imaging today

## 2022-02-14 ENCOUNTER — APPOINTMENT (OUTPATIENT)
Dept: ORTHOPEDIC SURGERY | Facility: CLINIC | Age: 52
End: 2022-02-14
Payer: COMMERCIAL

## 2022-02-14 DIAGNOSIS — S86.812D STRAIN OF OTHER MUSCLE(S) AND TENDON(S) AT LOWER LEG LEVEL, LEFT LEG, SUBSEQUENT ENCOUNTER: ICD-10-CM

## 2022-02-14 PROCEDURE — 99213 OFFICE O/P EST LOW 20 MIN: CPT

## 2022-02-14 NOTE — PHYSICAL EXAM
[de-identified] : Patient is WDWN, alert, and in no acute distress. Breathing is unlabored. He is grossly oriented to person, place, and time.\par \par \par Left knee: \par Incision is well healed over the patella. \par There are no signs of infection. \par No deformities or visible rashes to the lower extremity. \par ROM: 0-100°.  [de-identified] : no imaging today

## 2022-02-14 NOTE — HISTORY OF PRESENT ILLNESS
[de-identified] : The patient is a 50 year male who returns for a follow up visit after undergoing Repair of left patellar tendon and application of long leg cylinder cast at [facility]. The surgery was on 10/15/2021. The cast was removed in office on 11/22/21. Patient overall doing well and planning a return to work

## 2022-02-14 NOTE — REASON FOR VISIT
[Follow-Up Visit] : a follow-up visit for [Leg Fracture] : leg fracture [FreeTextEntry2] : s/p Repair of left patellar tendon

## 2022-05-27 NOTE — REASON FOR VISIT
[Initial Evaluation] : an initial evaluation [FreeTextEntry1] : Colon cancer screening DC instructions

## 2023-04-21 NOTE — ASU PATIENT PROFILE, ADULT - NSTOBACCONEVERSMOKERY/N_GEN_A
Source of information: GREGORY ELIZABETH, Chart review  Patient language: Barbadian  : # [id number] Jose Juan    HPI:      Patient is a 66y old  Male with PMH HLD HTN asthma, found to have acute L1 compression fracture. Pain consulted for back pain. Pt seen and examined at bedside. Reports lumbar back pain score 7-8/10 and not tolerable SCALE USED: (1-10 VNRS). Pt describes pain as aching, non- radiating, alleviated by pain medication, exacerbated by movement and palpation. Has not yet started PO diet. Reports nausea, SOB on exertion. Denies lethargy, chest pain, vomiting, constipation.  Also reports b/l leg cramps. Denies numbness/ tingling. Patient stated goal for pain control: to be able to take deep breaths, get out of bed to chair and ambulate with tolerable pain control. Pt reports taking ibuprofen for pain at home.     PAST MEDICAL & SURGICAL HISTORY:  HTN (hypertension)      Varicose veins      HLD (hyperlipidemia)      Asthma      No significant past surgical history          FAMILY HISTORY:      Social History:  Lives at home with domestic partner (21 Apr 2023 10:32)   [X ] Denies ETOH use, illicit drug use and smoking    Allergies    No Known Allergies    MEDICATIONS  (STANDING):  acetaminophen     Tablet .. 1000 milliGRAM(s) Oral every 6 hours  amLODIPine   Tablet 5 milliGRAM(s) Oral daily  enoxaparin Injectable 40 milliGRAM(s) SubCutaneous every 24 hours  fluticasone propionate 50 MICROgram(s)/spray Nasal Spray 1 Spray(s) Both Nostrils two times a day  gabapentin 100 milliGRAM(s) Oral three times a day  ketorolac   Injectable 15 milliGRAM(s) IV Push every 6 hours  lidocaine   4% Patch 1 Patch Transdermal every 24 hours  losartan 100 milliGRAM(s) Oral daily  losartan 100 milliGRAM(s) Oral once  montelukast 10 milliGRAM(s) Oral at bedtime  pantoprazole    Tablet 40 milliGRAM(s) Oral before breakfast  polyethylene glycol 3350 17 Gram(s) Oral daily  senna 2 Tablet(s) Oral at bedtime    MEDICATIONS  (PRN):  albuterol    90 MICROgram(s) HFA Inhaler 2 Puff(s) Inhalation every 6 hours PRN Shortness of Breath and/or Wheezing  ondansetron Injectable 4 milliGRAM(s) IV Push every 8 hours PRN Nausea and/or Vomiting  oxyCODONE    IR 5 milliGRAM(s) Oral every 4 hours PRN Severe Pain (7 - 10)      Vital Signs Last 24 Hrs  T(C): 36.9 (21 Apr 2023 14:17), Max: 36.9 (21 Apr 2023 14:17)  T(F): 98.4 (21 Apr 2023 14:17), Max: 98.4 (21 Apr 2023 14:17)  HR: 75 (21 Apr 2023 14:17) (70 - 85)  BP: 231/86 (21 Apr 2023 14:17) (140/73 - 233/89)  BP(mean): --  RR: 19 (21 Apr 2023 14:17) (18 - 19)  SpO2: 92% (21 Apr 2023 14:17) (92% - 97%)    Parameters below as of 21 Apr 2023 14:17  Patient On (Oxygen Delivery Method): room air        LABS: Reviewed.                          10.8   7.24  )-----------( 135      ( 21 Apr 2023 05:18 )             33.4     04-21    141  |  110<H>  |  7   ----------------------------<  165<H>  3.3<L>   |  23  |  0.85    Ca    8.8      21 Apr 2023 05:18    TPro  6.9  /  Alb  2.8<L>  /  TBili  0.6  /  DBili  x   /  AST  29  /  ALT  14  /  AlkPhos  72  04-21      LIVER FUNCTIONS - ( 21 Apr 2023 05:18 )  Alb: 2.8 g/dL / Pro: 6.9 g/dL / ALK PHOS: 72 U/L / ALT: 14 U/L DA / AST: 29 U/L / GGT: x           Radiology: Reviewed.   < from: CT Lumbar Spine No Cont (04.21.23 @ 05:24) >  ACC: [id number] EXAM:  CT LUMBAR SPINE   ORDERED BY: SHARONA SHAY     ACC: [id number] EXAM:  CT THORACIC SPINE   ORDERED BY: SHARONA SHAY     PROCEDURE DATE:  04/21/2023          INTERPRETATION:  CT THORACIC SPINE, CT LUMBAR SPINE:    CLINICAL INDICATION: Fall    TECHNIQUE: CT of the thoracic and lumbar spine was performed without the   administration of intravenous contrast, according to standard protocol.    COMPARISON: CT abdomen 7/16/2018    FINDINGS:    ALIGNMENT: Thoracic kyphosis is preserved. Lumbar lordosis is preserved.    VERTEBRAE: The thoracic vertebral bodies are normal in height. Within the   lumbar spine, there is anterior compression deformity at L1 producing   approximately 25% loss of vertebral body height.    DISCS: The disc spaces are maintained.    EVALUATION OF INDIVIDUAL LEVELS DEMONSTRATES: No spinal canal or   neuroforaminal stenosis throughout the thoracic spine.    Within the lumbar spine:    T12-L1: Unremarkable    L1-L2: Unremarkable.    L2-L3: Mild disc bulge producing mild central canal stenosis without   significant neuroforaminal narrowing.    L3-L4: Broad disc bulge producing moderate to severe central canal   stenosis and associated mild to moderate bilateral neuroforaminal   narrowing.    L4-L5: Broad disc bulge producing moderate to severe central canal   stenosis and associated mild to moderate bilateral neuroforaminal   narrowing.    L5-S1: Mild disc bulge producing mild central canal stenosis without   significant neuroforaminal narrowing.    PARAVERTEBRAL SOFT TISSUES: The visualized paravertebral soft tissues   appear within normal limits.    Scarring at the left lung apex.    IMPRESSION:    Acute compression fracture at L1 with approximately 25% loss of vertebral   body height. No retropulsion into the ventral thecal sac. No traumatic   malalignment.    Chronic degenerative disc disease within the lumbar spine, see above for   details.    --- End of Report ---        < end of copied text >      ORT Score -   Family Hx of substance abuse	Female	      Male  Alcohol 	                                           1                     3  Illegal drugs	                                   2                     3  Rx drugs                                           4 	                  4  Personal Hx of substance abuse		  Alcohol 	                                          3	                  3  Illegal drugs                                     4	                  4  Rx drugs                                            5 	                  5  Age between 16- 45 years	           1                     1  hx preadolescent sexual abuse	   3 	                  0  Psychological disease		  ADD, OCD, bipolar, schizophrenia   2	          2  Depression                                           1 	          1  Total: 0    a score of 3 or lower indicates low risk for opioid abuse		  a score of 4-7 indicates moderate risk for opioid abuse		  a score of 8 or higher indicates high risk for opioid abuse    REVIEW OF SYSTEMS:  CONSTITUTIONAL: No fever + fatigue  HEENT:  No difficulty hearing, no change in vision  NECK: No pain or stiffness  RESPIRATORY: No cough, wheezing, chills or hemoptysis; + shortness of breath on exertion   CARDIOVASCULAR: No chest pain, palpitations, dizziness, or leg swelling   GASTROINTESTINAL: No loss of appetite, decreased PO intake. No abdominal or epigastric pain. + nausea, No vomiting; No diarrhea or constipation.   GENITOURINARY: No dysuria, frequency, hematuria, retention or incontinence  MUSCULOSKELETAL: + lumbar back pain no swelling; + b/l leg cramps, no upper or lower motor strength weakness, no saddle anesthesia, bowel/bladder incontinence, no falls   NEURO: No headaches, No numbness/tingling b/l LE, No weakness    PHYSICAL EXAM:  GENERAL:  + fatiguegd & Oriented X4, cooperative, NAD, Good concentration. Speech is muffled.   RESPIRATORY: Respirations even and unlabored. Diminshed to auscultation bilaterally; No rales, rhonchi, wheezing, or rubs  CARDIOVASCULAR: Normal S1/S2, regular rate and rhythm; No murmurs, rubs, or gallops. No JVD. + cardiac monitor in place  GASTROINTESTINAL:  Soft, Nontender, Nondistended; Bowel sounds present  PERIPHERAL VASCULAR:  Extremities warm without edema. 2+ Peripheral Pulses, No cyanosis, No calf tenderness  MUSCULOSKELETAL: Motor Strength 5/5 B/L upper and lower extremities; moves all extremities equally against gravity; + decreased ROM left LE; + left SLR at 45 degrees; + lumbar back tenderness on palpation   SKIN: Warm, dry, intact. No rashes, lesions, scars or wounds.     Risk factors associated with adverse outcomes related to opioid treatment  [ ]  Concurrent benzodiazepine use  [ ]  History/ Active substance use or alcohol use disorder  [ ] Psychiatric co-morbidity  [ ] Sleep apnea  [ ] COPD  [ ] BMI> 35  [ ] Liver dysfunction  [ ] Renal dysfunction  [ ] CHF  [X ] Smoker  [X ]  Age > 60 years    [ X]  [place]  Reviewed and Copied to Chart. See below.    Plan of care and goal oriented pain management treatment options were discussed with patient and /or primary care giver; all questions and concerns were addressed and care was aligned with patient's wishes.    Educated patient on goal oriented pain management treatment options      No

## 2024-01-30 ENCOUNTER — EMERGENCY (EMERGENCY)
Facility: HOSPITAL | Age: 54
LOS: 1 days | Discharge: HOME | End: 2024-01-30
Attending: EMERGENCY MEDICINE
Payer: COMMERCIAL

## 2024-01-30 VITALS
WEIGHT: 117.95 LBS | HEIGHT: 71 IN | RESPIRATION RATE: 16 BRPM | DIASTOLIC BLOOD PRESSURE: 90 MMHG | OXYGEN SATURATION: 98 % | SYSTOLIC BLOOD PRESSURE: 154 MMHG | TEMPERATURE: 98 F | HEART RATE: 87 BPM

## 2024-01-30 DIAGNOSIS — Z98.890 OTHER SPECIFIED POSTPROCEDURAL STATES: Chronic | ICD-10-CM

## 2024-01-30 DIAGNOSIS — Z98.89 OTHER SPECIFIED POSTPROCEDURAL STATES: Chronic | ICD-10-CM

## 2024-01-30 LAB — HIV 1 & 2 AB SERPL IA.RAPID: SIGNIFICANT CHANGE UP

## 2024-01-30 PROCEDURE — 99283 EMERGENCY DEPT VISIT LOW MDM: CPT

## 2024-01-30 PROCEDURE — 86703 HIV-1/HIV-2 1 RESULT ANTBDY: CPT

## 2024-01-30 PROCEDURE — 36415 COLL VENOUS BLD VENIPUNCTURE: CPT

## 2024-01-30 PROCEDURE — 80074 ACUTE HEPATITIS PANEL: CPT

## 2024-01-30 NOTE — ED PROVIDER NOTE - PATIENT PORTAL LINK FT
You can access the FollowMyHealth Patient Portal offered by [facility] by registering at the following website: [email or web address]. By joining [employer]’s FollowMyHealth portal, you will also be able to view your health information using other applications (apps) compatible with our system.

## 2024-01-30 NOTE — ED PROVIDER NOTE - CLINICAL SUMMARY MEDICAL DECISION MAKING FREE TEXT BOX
53-year-old male, presents from work for evaluation of unknown liquid that got in his mouth after lifting garbage bag.  No open wounds inside the mouth or on the face.  No eye irritation and patient denies any exposure to the eyes.  Had a lengthy conversation with patient about the very low risk of transmission of diseases such as HIV or hepatitis through droplet spill from the liquid.  In addition explained that today's blood work does not show an acute infection and which show only a previous infection.  Patient is insistent to be tested today.  Will do blood work and discharged with outpatient occupational health follow-up.

## 2024-01-30 NOTE — ED PROVIDER NOTE - OBJECTIVE STATEMENT
53-year-old male, history of hypertension, presents for evaluation status post exposure to an unknown fluid while at work earlier today.  Was lifting a garbage bag and a small amount of unknown liquid spilled and he got some in his mouth.  Spit it right away and rinse his mouth multiple times.  Denies any spill into the eye.  Denies any recent cuts or wounds inside the mouth or on the tongue.

## 2024-01-30 NOTE — ED ADULT NURSE NOTE - OBJECTIVE STATEMENT
Patient c/o garbage liquid splash on face and in mouth while at work x today. Pt requesting blood work. Pt denies any chest pain, burning of eyes or itching of body.

## 2024-01-30 NOTE — ED PROVIDER NOTE - PHYSICAL EXAMINATION
No eye irritation bilaterally.  No facial open wounds or cuts.  Intraoral mucosa and tongue intact without any ulcerations or wounds

## 2024-01-30 NOTE — ED PROVIDER NOTE - NSFOLLOWUPINSTRUCTIONS_ED_ALL_ED_FT
Follow-up with the occupational health at your job tomorrow.  As per your request  you were tested for HIV and hepatitis today however the tests performed today would reflect a previous existing infection and NOT an infection from today.  If there is a concern you should be retested in 6 weeks by your primary care doctor or your occupational health department at the job.    If you experience any new or worsening symptoms or if you are concerned you can always come back to the emergency for a re-evaluation.  Some results may not be available at the time of your discharge from the hospital. You can download the FOLLOW [facility] lina and have access to these

## 2024-01-30 NOTE — ED ADULT TRIAGE NOTE - CHIEF COMPLAINT QUOTE
While picking a garbage  liquid splash on my face while at work happened at 115pm pt washed his face with soap and water denies burning of eyes

## 2024-01-31 LAB
HAV IGM SER-ACNC: SIGNIFICANT CHANGE UP
HBV CORE IGM SER-ACNC: SIGNIFICANT CHANGE UP
HBV SURFACE AG SER-ACNC: SIGNIFICANT CHANGE UP
HCV AB S/CO SERPL IA: 0.09 S/CO — SIGNIFICANT CHANGE UP (ref 0–0.99)
HCV AB SERPL-IMP: SIGNIFICANT CHANGE UP